# Patient Record
Sex: FEMALE | Race: BLACK OR AFRICAN AMERICAN | Employment: FULL TIME | URBAN - METROPOLITAN AREA
[De-identification: names, ages, dates, MRNs, and addresses within clinical notes are randomized per-mention and may not be internally consistent; named-entity substitution may affect disease eponyms.]

---

## 2022-11-23 ENCOUNTER — APPOINTMENT (OUTPATIENT)
Dept: CT IMAGING | Age: 62
DRG: 305 | End: 2022-11-23
Payer: COMMERCIAL

## 2022-11-23 ENCOUNTER — APPOINTMENT (OUTPATIENT)
Dept: GENERAL RADIOLOGY | Age: 62
DRG: 305 | End: 2022-11-23
Payer: COMMERCIAL

## 2022-11-23 ENCOUNTER — HOSPITAL ENCOUNTER (INPATIENT)
Age: 62
LOS: 1 days | Discharge: HOME | DRG: 305 | End: 2022-11-24
Attending: EMERGENCY MEDICINE | Admitting: STUDENT IN AN ORGANIZED HEALTH CARE EDUCATION/TRAINING PROGRAM
Payer: COMMERCIAL

## 2022-11-23 DIAGNOSIS — R07.9 CHEST PAIN, UNSPECIFIED TYPE: ICD-10-CM

## 2022-11-23 DIAGNOSIS — I16.9 HYPERTENSIVE CRISIS: Primary | ICD-10-CM

## 2022-11-23 PROBLEM — I16.0 HYPERTENSIVE URGENCY: Status: ACTIVE | Noted: 2022-11-23

## 2022-11-23 LAB
ABSOLUTE EOS #: 0.07 K/UL (ref 0–0.44)
ABSOLUTE IMMATURE GRANULOCYTE: 0.04 K/UL (ref 0–0.3)
ABSOLUTE LYMPH #: 2.53 K/UL (ref 1.1–3.7)
ABSOLUTE MONO #: 0.86 K/UL (ref 0.1–1.2)
ANION GAP SERPL CALCULATED.3IONS-SCNC: 12 MMOL/L (ref 9–17)
BASOPHILS # BLD: 0 % (ref 0–2)
BASOPHILS ABSOLUTE: <0.03 K/UL (ref 0–0.2)
BUN BLDV-MCNC: 22 MG/DL (ref 8–23)
CALCIUM SERPL-MCNC: 9.3 MG/DL (ref 8.6–10.4)
CHLORIDE BLD-SCNC: 103 MMOL/L (ref 98–107)
CO2: 23 MMOL/L (ref 20–31)
CREAT SERPL-MCNC: 0.9 MG/DL (ref 0.5–0.9)
EOSINOPHILS RELATIVE PERCENT: 1 % (ref 1–4)
GFR SERPL CREATININE-BSD FRML MDRD: >60 ML/MIN/1.73M2
GLUCOSE BLD-MCNC: 81 MG/DL (ref 70–99)
HCT VFR BLD CALC: 43.8 % (ref 36.3–47.1)
HEMOGLOBIN: 13.5 G/DL (ref 11.9–15.1)
IMMATURE GRANULOCYTES: 1 %
LYMPHOCYTES # BLD: 35 % (ref 24–43)
MAGNESIUM: 2.3 MG/DL (ref 1.6–2.6)
MCH RBC QN AUTO: 30.8 PG (ref 25.2–33.5)
MCHC RBC AUTO-ENTMCNC: 30.8 G/DL (ref 28.4–34.8)
MCV RBC AUTO: 99.8 FL (ref 82.6–102.9)
MONOCYTES # BLD: 12 % (ref 3–12)
NRBC AUTOMATED: 0 PER 100 WBC
PDW BLD-RTO: 12.3 % (ref 11.8–14.4)
PLATELET # BLD: 228 K/UL (ref 138–453)
PMV BLD AUTO: 10.9 FL (ref 8.1–13.5)
POTASSIUM SERPL-SCNC: 4.4 MMOL/L (ref 3.7–5.3)
PRO-BNP: 30 PG/ML
RBC # BLD: 4.39 M/UL (ref 3.95–5.11)
SEG NEUTROPHILS: 51 % (ref 36–65)
SEGMENTED NEUTROPHILS ABSOLUTE COUNT: 3.75 K/UL (ref 1.5–8.1)
SODIUM BLD-SCNC: 138 MMOL/L (ref 135–144)
TROPONIN, HIGH SENSITIVITY: 7 NG/L (ref 0–14)
TROPONIN, HIGH SENSITIVITY: 8 NG/L (ref 0–14)
TSH SERPL DL<=0.05 MIU/L-ACNC: 1.34 UIU/ML (ref 0.3–5)
WBC # BLD: 7.3 K/UL (ref 3.5–11.3)

## 2022-11-23 PROCEDURE — 96376 TX/PRO/DX INJ SAME DRUG ADON: CPT

## 2022-11-23 PROCEDURE — 74174 CTA ABD&PLVS W/CONTRAST: CPT

## 2022-11-23 PROCEDURE — 83880 ASSAY OF NATRIURETIC PEPTIDE: CPT

## 2022-11-23 PROCEDURE — 2060000000 HC ICU INTERMEDIATE R&B

## 2022-11-23 PROCEDURE — 96375 TX/PRO/DX INJ NEW DRUG ADDON: CPT

## 2022-11-23 PROCEDURE — 6370000000 HC RX 637 (ALT 250 FOR IP): Performed by: STUDENT IN AN ORGANIZED HEALTH CARE EDUCATION/TRAINING PROGRAM

## 2022-11-23 PROCEDURE — 99222 1ST HOSP IP/OBS MODERATE 55: CPT | Performed by: STUDENT IN AN ORGANIZED HEALTH CARE EDUCATION/TRAINING PROGRAM

## 2022-11-23 PROCEDURE — 6360000004 HC RX CONTRAST MEDICATION: Performed by: EMERGENCY MEDICINE

## 2022-11-23 PROCEDURE — 84484 ASSAY OF TROPONIN QUANT: CPT

## 2022-11-23 PROCEDURE — 94761 N-INVAS EAR/PLS OXIMETRY MLT: CPT

## 2022-11-23 PROCEDURE — 99285 EMERGENCY DEPT VISIT HI MDM: CPT

## 2022-11-23 PROCEDURE — 84443 ASSAY THYROID STIM HORMONE: CPT

## 2022-11-23 PROCEDURE — 2500000003 HC RX 250 WO HCPCS: Performed by: EMERGENCY MEDICINE

## 2022-11-23 PROCEDURE — 82088 ASSAY OF ALDOSTERONE: CPT

## 2022-11-23 PROCEDURE — 85025 COMPLETE CBC W/AUTO DIFF WBC: CPT

## 2022-11-23 PROCEDURE — 6360000002 HC RX W HCPCS: Performed by: EMERGENCY MEDICINE

## 2022-11-23 PROCEDURE — 83835 ASSAY OF METANEPHRINES: CPT

## 2022-11-23 PROCEDURE — 96374 THER/PROPH/DIAG INJ IV PUSH: CPT

## 2022-11-23 PROCEDURE — 93005 ELECTROCARDIOGRAM TRACING: CPT | Performed by: STUDENT IN AN ORGANIZED HEALTH CARE EDUCATION/TRAINING PROGRAM

## 2022-11-23 PROCEDURE — 71045 X-RAY EXAM CHEST 1 VIEW: CPT

## 2022-11-23 PROCEDURE — 71275 CT ANGIOGRAPHY CHEST: CPT

## 2022-11-23 PROCEDURE — 6360000002 HC RX W HCPCS

## 2022-11-23 PROCEDURE — 6370000000 HC RX 637 (ALT 250 FOR IP)

## 2022-11-23 PROCEDURE — 83735 ASSAY OF MAGNESIUM: CPT

## 2022-11-23 PROCEDURE — 80048 BASIC METABOLIC PNL TOTAL CA: CPT

## 2022-11-23 PROCEDURE — 2580000003 HC RX 258

## 2022-11-23 RX ORDER — ACETAMINOPHEN 325 MG/1
650 TABLET ORAL EVERY 6 HOURS PRN
Status: DISCONTINUED | OUTPATIENT
Start: 2022-11-23 | End: 2022-11-24 | Stop reason: HOSPADM

## 2022-11-23 RX ORDER — ONDANSETRON 4 MG/1
4 TABLET, ORALLY DISINTEGRATING ORAL EVERY 8 HOURS PRN
Status: DISCONTINUED | OUTPATIENT
Start: 2022-11-23 | End: 2022-11-24 | Stop reason: HOSPADM

## 2022-11-23 RX ORDER — ACETAMINOPHEN 650 MG/1
650 SUPPOSITORY RECTAL EVERY 6 HOURS PRN
Status: DISCONTINUED | OUTPATIENT
Start: 2022-11-23 | End: 2022-11-24 | Stop reason: HOSPADM

## 2022-11-23 RX ORDER — ENOXAPARIN SODIUM 100 MG/ML
40 INJECTION SUBCUTANEOUS DAILY
Status: DISCONTINUED | OUTPATIENT
Start: 2022-11-23 | End: 2022-11-24 | Stop reason: HOSPADM

## 2022-11-23 RX ORDER — SODIUM CHLORIDE 0.9 % (FLUSH) 0.9 %
5-40 SYRINGE (ML) INJECTION EVERY 12 HOURS SCHEDULED
Status: DISCONTINUED | OUTPATIENT
Start: 2022-11-23 | End: 2022-11-24 | Stop reason: HOSPADM

## 2022-11-23 RX ORDER — ACETAMINOPHEN 325 MG/1
650 TABLET ORAL EVERY 4 HOURS PRN
Status: DISCONTINUED | OUTPATIENT
Start: 2022-11-23 | End: 2022-11-24 | Stop reason: HOSPADM

## 2022-11-23 RX ORDER — ENOXAPARIN SODIUM 100 MG/ML
40 INJECTION SUBCUTANEOUS DAILY
Status: CANCELLED | OUTPATIENT
Start: 2022-11-23

## 2022-11-23 RX ORDER — CARVEDILOL 12.5 MG/1
12.5 TABLET ORAL 2 TIMES DAILY WITH MEALS
Status: DISCONTINUED | OUTPATIENT
Start: 2022-11-23 | End: 2022-11-24 | Stop reason: HOSPADM

## 2022-11-23 RX ORDER — AMLODIPINE BESYLATE 5 MG/1
5 TABLET ORAL DAILY
Status: DISCONTINUED | OUTPATIENT
Start: 2022-11-23 | End: 2022-11-24 | Stop reason: HOSPADM

## 2022-11-23 RX ORDER — ONDANSETRON 4 MG/1
4 TABLET, ORALLY DISINTEGRATING ORAL EVERY 8 HOURS PRN
Status: CANCELLED | OUTPATIENT
Start: 2022-11-23

## 2022-11-23 RX ORDER — M-VIT,TX,IRON,MINS/CALC/FOLIC 27MG-0.4MG
1 TABLET ORAL DAILY
COMMUNITY

## 2022-11-23 RX ORDER — SODIUM CHLORIDE 9 MG/ML
INJECTION, SOLUTION INTRAVENOUS PRN
Status: DISCONTINUED | OUTPATIENT
Start: 2022-11-23 | End: 2022-11-24 | Stop reason: HOSPADM

## 2022-11-23 RX ORDER — IBUPROFEN 200 MG
200 TABLET ORAL EVERY 8 HOURS PRN
Status: DISCONTINUED | OUTPATIENT
Start: 2022-11-23 | End: 2022-11-24 | Stop reason: HOSPADM

## 2022-11-23 RX ORDER — MULTIVIT WITH MINERALS/LUTEIN
250 TABLET ORAL DAILY
COMMUNITY

## 2022-11-23 RX ORDER — ONDANSETRON 2 MG/ML
4 INJECTION INTRAMUSCULAR; INTRAVENOUS ONCE
Status: DISCONTINUED | OUTPATIENT
Start: 2022-11-23 | End: 2022-11-23

## 2022-11-23 RX ORDER — ONDANSETRON 2 MG/ML
4 INJECTION INTRAMUSCULAR; INTRAVENOUS EVERY 6 HOURS PRN
Status: DISCONTINUED | OUTPATIENT
Start: 2022-11-23 | End: 2022-11-24 | Stop reason: HOSPADM

## 2022-11-23 RX ORDER — NICARDIPINE HYDROCHLORIDE 0.1 MG/ML
2.5-15 INJECTION INTRAVENOUS CONTINUOUS
Status: DISCONTINUED | OUTPATIENT
Start: 2022-11-23 | End: 2022-11-23

## 2022-11-23 RX ORDER — FENTANYL CITRATE 50 UG/ML
50 INJECTION, SOLUTION INTRAMUSCULAR; INTRAVENOUS ONCE
Status: COMPLETED | OUTPATIENT
Start: 2022-11-23 | End: 2022-11-23

## 2022-11-23 RX ORDER — SODIUM CHLORIDE 0.9 % (FLUSH) 0.9 %
5-40 SYRINGE (ML) INJECTION PRN
Status: DISCONTINUED | OUTPATIENT
Start: 2022-11-23 | End: 2022-11-24 | Stop reason: HOSPADM

## 2022-11-23 RX ORDER — POLYETHYLENE GLYCOL 3350 17 G/17G
17 POWDER, FOR SOLUTION ORAL DAILY PRN
Status: DISCONTINUED | OUTPATIENT
Start: 2022-11-23 | End: 2022-11-24 | Stop reason: HOSPADM

## 2022-11-23 RX ADMIN — CARVEDILOL 12.5 MG: 12.5 TABLET, FILM COATED ORAL at 21:19

## 2022-11-23 RX ADMIN — ACETAMINOPHEN 650 MG: 325 TABLET ORAL at 17:38

## 2022-11-23 RX ADMIN — ONDANSETRON 4 MG: 2 INJECTION INTRAMUSCULAR; INTRAVENOUS at 14:55

## 2022-11-23 RX ADMIN — NICARDIPINE HYDROCHLORIDE 7.5 MG/HR: 0.1 INJECTION INTRAVENOUS at 13:55

## 2022-11-23 RX ADMIN — FENTANYL CITRATE 50 MCG: 50 INJECTION, SOLUTION INTRAMUSCULAR; INTRAVENOUS at 11:36

## 2022-11-23 RX ADMIN — IOPAMIDOL 100 ML: 755 INJECTION, SOLUTION INTRAVENOUS at 11:47

## 2022-11-23 RX ADMIN — NICARDIPINE HYDROCHLORIDE 7.5 MG/HR: 0.1 INJECTION INTRAVENOUS at 11:28

## 2022-11-23 RX ADMIN — NICARDIPINE HYDROCHLORIDE 5 MG/HR: 0.1 INJECTION INTRAVENOUS at 11:06

## 2022-11-23 RX ADMIN — SODIUM CHLORIDE, PRESERVATIVE FREE 10 ML: 5 INJECTION INTRAVENOUS at 21:03

## 2022-11-23 ASSESSMENT — ENCOUNTER SYMPTOMS
NAUSEA: 0
RHINORRHEA: 0
VOMITING: 0
SHORTNESS OF BREATH: 0
COUGH: 0
DIARRHEA: 0
BACK PAIN: 1
SORE THROAT: 0
ABDOMINAL PAIN: 0

## 2022-11-23 ASSESSMENT — PAIN SCALES - GENERAL
PAINLEVEL_OUTOF10: 8
PAINLEVEL_OUTOF10: 8

## 2022-11-23 ASSESSMENT — PAIN - FUNCTIONAL ASSESSMENT: PAIN_FUNCTIONAL_ASSESSMENT: 0-10

## 2022-11-23 ASSESSMENT — PAIN DESCRIPTION - LOCATION: LOCATION: HEAD

## 2022-11-23 NOTE — ED PROVIDER NOTES
[facility]  ED  EMERGENCY DEPARTMENT ENCOUNTER    Pt Name: Briana Bello  MRN: [id number]  [id number]  Date of evaluation: 11/23/2022      CHIEF COMPLAINT       Chief Complaint   Patient presents with    Chest Pain    Back Pain         HISTORY OF PRESENT ILLNESS    Briana Bello is a 57 y.o. female who presents chest pain. Patient states it was at work and began suddenly radiates into her back sharp. Some paresthesias down her right arm although that has been an ongoing issue for her. States she formally was on blood pressure medicine but her doctor stopped them 2 years ago and has not been on them since. No recent illnesses no recent injuries. States she is never had pain like this before. Does not ripping or migrating up and on her back at this time. Just does not feel well. Blood pressure for EMS was systolic of 655 332W here on arrival did receive 2 sublingual nitro with some improvement of her pain. Also received aspirin from EMS         REVIEW OF SYSTEMS       Review of Systems   Constitutional:  Negative for chills and fever. HENT:  Negative for rhinorrhea and sore throat. Eyes:  Negative for visual disturbance. Respiratory:  Negative for cough and shortness of breath. Cardiovascular:  Positive for chest pain. Gastrointestinal:  Negative for abdominal pain, diarrhea, nausea and vomiting. Genitourinary:  Negative for difficulty urinating. Musculoskeletal:  Positive for back pain. Negative for neck pain. Skin:  Negative for rash. Neurological:  Negative for headaches. PAST MEDICAL HISTORY    has no past medical history on file. SURGICAL HISTORY      has a past surgical history that includes Lithotripsy and Appendectomy. CURRENT MEDICATIONS       Previous Medications    IBUPROFEN (ADVIL;MOTRIN) 800 MG TABLET    TAKE ONE TABLET BY MOUTH EVERY 8 HOURS AS NEEDED FOR PAIN       ALLERGIES     has No Known Allergies.     FAMILY HISTORY She indicated that her mother is alive. She indicated that her father is . She indicated that both of her sisters are alive. She indicated that her brother is alive. family history includes Diabetes in her mother; Heart Failure in her father; High Blood Pressure in her mother; Kidney Disease in her mother. SOCIAL HISTORY      reports that she has never smoked. She does not have any smokeless tobacco history on file. She reports that she does not drink alcohol and does not use drugs. PHYSICAL EXAM     INITIAL VITALS:  height is 5' 8\" (1.727 m) and weight is 168 lb (76.2 kg). Her oral temperature is 98 °F (36.7 °C). Her blood pressure is 150/90 (abnormal) and her pulse is 73. Her respiration is 16 and oxygen saturation is 92%. Physical Exam  Constitutional:       General: She is not in acute distress. Comments: Patient is appears uncomfortable but not ill or toxic   HENT:      Head: Normocephalic and atraumatic. Mouth/Throat:      Mouth: Mucous membranes are moist.      Pharynx: Oropharynx is clear. Eyes:      General: No scleral icterus. Pupils: Pupils are equal, round, and reactive to light. Cardiovascular:      Rate and Rhythm: Normal rate and regular rhythm. Heart sounds: Normal heart sounds. No murmur heard. No friction rub. No gallop. Comments: Equal radial and pedal pulses bilaterally  Pulmonary:      Effort: Pulmonary effort is normal. No respiratory distress. Breath sounds: Normal breath sounds. Abdominal:      General: There is no distension. Palpations: Abdomen is soft. Tenderness: There is no abdominal tenderness. There is no guarding or rebound. Comments: No pulsatile abdominal mass   Musculoskeletal:         General: Normal range of motion. Cervical back: Normal range of motion and neck supple. Skin:     General: Skin is warm and dry.    Neurological:      Mental Status: She is alert and oriented to person, place, and time.      Gait: Gait normal.   Psychiatric:         Mood and Affect: Mood normal.         Behavior: Behavior normal.       DIFFERENTIAL DIAGNOSIS/ MDM:     Given sudden onset nature chest pain rating the back primary concern is aortic dissection. Will start Cardene for blood pressure control heart rate is already in the 60s. Will order chest x-ray EKG labs plan for dissection study monitor closely    DIAGNOSTIC RESULTS     EKG: All EKG's are interpreted by the Emergency Department Physician who either signs or Co-signs this chart in the [address] a cardiologist.    sinus rhythm 67 normal intervals normal axis no acute ST or T changes. There is LVH. No acute findings. No old for comparison    RADIOLOGY:   I directly visualized the following  images and reviewed theradiologist interpretations:     CTA CHEST W WO CONTRAST   Final Result   1. No evidence for thoracic or abdominal aorta aneurysm or dissection. 2. Calcific and mostly noncalcific plaque in the tortuous proximal left main   renal artery. Tortuosity limits evaluation of degree of stenosis but appears   to be less than 50%. 3. No acute infective or inflammatory process. CTA ABDOMEN PELVIS W CONTRAST   Final Result   1. No evidence for thoracic or abdominal aorta aneurysm or dissection. 2. Calcific and mostly noncalcific plaque in the tortuous proximal left main   renal artery. Tortuosity limits evaluation of degree of stenosis but appears   to be less than 50%. 3. No acute infective or inflammatory process. XR CHEST PORTABLE   Final Result   1. Cardiomegaly. 2. No acute focal airspace consolidation.                ED BEDSIDE ULTRASOUND:   none    LABS:  Labs Reviewed   CBC WITH AUTO DIFFERENTIAL - Abnormal; Notable for the following components:       Result Value    Immature Granulocytes 1 (*)     All other components within normal limits   BASIC METABOLIC PANEL   BRAIN NATRIURETIC PEPTIDE   MAGNESIUM   TROPONIN   TROPONIN EMERGENCY DEPARTMENT COURSE:   Vitals:    Vitals:    22 1210 22 1220 22 1230 22 1240   BP: (!) 148/93 130/85 (!) 137/90 (!) 150/90   Pulse: 84 77 78 73   Resp: 12  16   Temp:       TempSrc:       SpO2: 95% 93% 92% 92%   Weight:       Height:         -------------------------  BP: (!) 150/90, Temp: 98 °F (36.7 °C), Heart Rate: 73, Resp: 16    11:34 AM EST  Recheck this time has been on the Cardene drip systolic already down in the 160s 7. No chest pain but still has back pain still appears uncomfortable will dose with fentanyl, continue to await test continue to titrate    1:05 PM EST  Thankfully no dissection seen on CT especially she now recalls that \"this is what my mother  from. \"  Patient is resting comfortably blood pressure is 150/90. Chest pain has resolved back pain significantly improved again still not migrating. Given elevated blood pressure with chest pain concern for endorgan damage will admit. Call out to medicine    CRITICAL CARE:     CRITICAL CARE: There was a high probability of clinically significant/life threatening deterioration in this patient's condition which required my urgent intervention. Total critical care time was 30 minutes. This excludes any time for separately reportable procedures. CONSULTS:  IP CONSULT TO INTERNAL MEDICINE    PROCEDURES:  None    FINAL IMPRESSION      1. Hypertensive crisis    2. Chest pain, unspecified type          DISPOSITION/PLAN       PATIENT REFERRED TO:  No follow-up provider specified. DISCHARGE MEDICATIONS:  New Prescriptions    No medications on file       (Please note that portions of this note were completed with a voice recognition program.  Efforts were made to edit the dictations butoccasionally words are mis-transcribed. )    Francisco Javier Barrios MD  Attending Emergency Physician                   Francisco Javier Barrios MD  [phone]

## 2022-11-23 NOTE — CARE COORDINATION
Case Management Assessment  Initial Evaluation    Date/Time of Evaluation: 11/23/2022 1:46 PM  Assessment Completed by: Neeta Loaiza RN    If patient is discharged prior to next notation, then this note serves as note for discharge by case management. Patient Name: Deena Bryant                   YOB: 1960  Diagnosis: No admission diagnoses are documented for this encounter. Date / Time: 11/23/2022 10:44 AM    Patient Admission Status: Emergency   Readmission Risk (Low < 19, Mod (19-27), High > 27): No data recorded  Current PCP: No primary care provider on file. PCP verified by CM? Yes Ronald Vyas)    Chart Reviewed: Yes      History Provided by: Patient  Patient Orientation: Alert and Oriented    Patient Cognition: Alert    Hospitalization in the last 30 days (Readmission):  No    If yes, Readmission Assessment in CM Navigator will be completed. Advance Directives:      Code Status: Not on file   Patient's Primary Decision Maker is:        Discharge Planning:    Patient lives with: Spouse/Significant Other Type of Home: House  Primary Care Giver: Self  Patient Support Systems include: Spouse/Significant Other   Current Financial resources:    Current community resources:    Current services prior to admission: None            Current DME:              Type of Home Care services:  None    ADLS  Prior functional level: Independent in ADLs/IADLs  Current functional level: Independent in ADLs/IADLs    PT AM-PAC:   /24  OT AM-PAC:   /24    Family can provide assistance at DC: No  Would you like Case Management to discuss the discharge plan with any other family members/significant others, and if so, who?     Plans to Return to Present Housing: Yes  Other Identified Issues/Barriers to RETURNING to current housing: none  Potential Assistance needed at discharge: N/A            Potential DME:    Patient expects to discharge to: [address] [place] [address] for transportation at discharge:      Financial    Payor: /     Does insurance require precert for SNF: Yes    Potential assistance Purchasing Medications: No  Meds-to-Beds request:        [facility] ([facility]  [address] [place] [address]  [address] [facility] ([facility]) [address]  Phone: [phone] Fax: [phone]      Notes:    Factors facilitating achievement of predicted outcomes: Family support    Barriers to discharge: Additional Case Management Notes: home with     The Plan for Transition of Care is related to the following treatment goals of No admission diagnoses are documented for this encounter. IF APPLICABLE: The Patient and/or patient representative Yane and her family were provided with a choice of provider and agrees with the discharge plan. Freedom of choice list with basic dialogue that supports the patient's individualized plan of care/goals and shares the quality data associated with the providers was provided to:     Patient Representative Name:       The Patient and/or Patient Representative Agree with the Discharge Plan?       Sandra Palacio RN  Case Management Department  Ph: [phone] Fax:

## 2022-11-23 NOTE — ED TRIAGE NOTES
Patient was brought by [facility] for chest pain and back pain with pain going down her right arm. Patient states she was at work today when the pain started. Patient states she was taken off her blood pressure meds two years ago. Patient is hypertensive on arrival  Pt received ASA and 2 nitros PTA. Pt respirations are even and unlabored, pt is oriented X 4, speaking in complete sentences, bed is in the lowest position, call light is within reach. Will continue to monitor.

## 2022-11-23 NOTE — ED NOTES
Jenny, tech transporting patient to room 0405 by bed.  No infusions running     Casper Irizarry RN  11/23/22 [id number]

## 2022-11-24 VITALS
HEART RATE: 82 BPM | WEIGHT: 168.65 LBS | OXYGEN SATURATION: 98 % | BODY MASS INDEX: 24.98 KG/M2 | HEIGHT: 69 IN | SYSTOLIC BLOOD PRESSURE: 143 MMHG | RESPIRATION RATE: 11 BRPM | TEMPERATURE: 98.8 F | DIASTOLIC BLOOD PRESSURE: 95 MMHG

## 2022-11-24 PROBLEM — R07.9 CHEST PAIN: Status: RESOLVED | Noted: 2022-11-23 | Resolved: 2022-11-24

## 2022-11-24 PROBLEM — I16.1 HYPERTENSIVE EMERGENCY: Status: ACTIVE | Noted: 2022-11-24

## 2022-11-24 PROBLEM — I70.1 LEFT RENAL ARTERY STENOSIS (HCC): Status: ACTIVE | Noted: 2022-11-24

## 2022-11-24 PROBLEM — I16.1 HYPERTENSIVE EMERGENCY: Status: RESOLVED | Noted: 2022-11-24 | Resolved: 2022-11-24

## 2022-11-24 LAB
AMPHETAMINE SCREEN URINE: NEGATIVE
BARBITURATE SCREEN URINE: NEGATIVE
BENZODIAZEPINE SCREEN, URINE: NEGATIVE
BILIRUBIN URINE: NEGATIVE
CANNABINOID SCREEN URINE: NEGATIVE
CASTS UA: NORMAL /LPF (ref 0–8)
COCAINE METABOLITE, URINE: NEGATIVE
COLOR: YELLOW
EPITHELIAL CELLS UA: NORMAL /HPF (ref 0–5)
FENTANYL URINE: POSITIVE
GLUCOSE URINE: NEGATIVE
KETONES, URINE: NEGATIVE
LEUKOCYTE ESTERASE, URINE: ABNORMAL
METHADONE SCREEN, URINE: NEGATIVE
NITRITE, URINE: NEGATIVE
OPIATES, URINE: NEGATIVE
OXYCODONE SCREEN URINE: NEGATIVE
PH UA: 6.5 (ref 5–8)
PHENCYCLIDINE, URINE: NEGATIVE
PROTEIN UA: NEGATIVE
RBC UA: NORMAL /HPF (ref 0–4)
SPECIFIC GRAVITY UA: 1.02 (ref 1–1.03)
TEST INFORMATION: ABNORMAL
TURBIDITY: CLEAR
URINE HGB: ABNORMAL
UROBILINOGEN, URINE: NORMAL
WBC UA: NORMAL /HPF (ref 0–5)

## 2022-11-24 PROCEDURE — 99232 SBSQ HOSP IP/OBS MODERATE 35: CPT | Performed by: STUDENT IN AN ORGANIZED HEALTH CARE EDUCATION/TRAINING PROGRAM

## 2022-11-24 PROCEDURE — 84244 ASSAY OF RENIN: CPT

## 2022-11-24 PROCEDURE — 36415 COLL VENOUS BLD VENIPUNCTURE: CPT

## 2022-11-24 PROCEDURE — 6370000000 HC RX 637 (ALT 250 FOR IP): Performed by: STUDENT IN AN ORGANIZED HEALTH CARE EDUCATION/TRAINING PROGRAM

## 2022-11-24 PROCEDURE — 6370000000 HC RX 637 (ALT 250 FOR IP)

## 2022-11-24 PROCEDURE — 81001 URINALYSIS AUTO W/SCOPE: CPT

## 2022-11-24 PROCEDURE — 2580000003 HC RX 258

## 2022-11-24 PROCEDURE — 80307 DRUG TEST PRSMV CHEM ANLYZR: CPT

## 2022-11-24 RX ORDER — CARVEDILOL 12.5 MG/1
12.5 TABLET ORAL 2 TIMES DAILY WITH MEALS
Qty: 60 TABLET | Refills: 3 | Status: SHIPPED | OUTPATIENT
Start: 2022-11-24

## 2022-11-24 RX ORDER — AMLODIPINE BESYLATE 5 MG/1
5 TABLET ORAL DAILY
Qty: 30 TABLET | Refills: 3 | Status: SHIPPED | OUTPATIENT
Start: 2022-11-25

## 2022-11-24 RX ADMIN — CARVEDILOL 12.5 MG: 12.5 TABLET, FILM COATED ORAL at 08:19

## 2022-11-24 RX ADMIN — AMLODIPINE BESYLATE 5 MG: 5 TABLET ORAL at 08:19

## 2022-11-24 RX ADMIN — CARVEDILOL 12.5 MG: 12.5 TABLET, FILM COATED ORAL at 16:01

## 2022-11-24 RX ADMIN — SODIUM CHLORIDE, PRESERVATIVE FREE 10 ML: 5 INJECTION INTRAVENOUS at 08:19

## 2022-11-24 RX ADMIN — ACETAMINOPHEN 650 MG: 325 TABLET ORAL at 11:22

## 2022-11-24 ASSESSMENT — PAIN DESCRIPTION - LOCATION: LOCATION: BACK

## 2022-11-24 ASSESSMENT — PAIN SCALES - GENERAL
PAINLEVEL_OUTOF10: 1
PAINLEVEL_OUTOF10: 3

## 2022-11-24 ASSESSMENT — PAIN - FUNCTIONAL ASSESSMENT: PAIN_FUNCTIONAL_ASSESSMENT: ACTIVITIES ARE NOT PREVENTED

## 2022-11-24 ASSESSMENT — PAIN DESCRIPTION - DESCRIPTORS: DESCRIPTORS: ACHING

## 2022-11-24 ASSESSMENT — PAIN DESCRIPTION - ORIENTATION: ORIENTATION: RIGHT;UPPER

## 2022-11-24 NOTE — PROGRESS NOTES
[facility]  Internal Medicine Teaching Residency Program  Inpatient Daily Progress Note  ______________________________________________________________________________    Patient: Nathen Mosqueda  YOB: 1960   HMV:[id number]    Acct: [de-identified]     Room: [address] [place] [address]-  Admit date: 11/23/2022  Today's date: 11/24/22  Number of days in the hospital: 1    SUBJECTIVE   Admitting Diagnosis: Hypertensive emergency  CC: Chest pain, elevated blood pressure  Pt examined at bedside. Chart & results reviewed. -Seen and examined at bedside, patient is off the drip, BP well controlled 119/86, heart rate 73, currently not oral BP meds, patient is ready to be discharged with outpatient follow-up for renal artery stenosis, urinary metanephrine level. ROS:  Constitutional:  negative for chills, fevers, sweats  Respiratory:  negative for cough, dyspnea on exertion, hemoptysis, shortness of breath, wheezing  Cardiovascular:  negative for chest pain, chest pressure/discomfort, lower extremity edema, palpitations  Gastrointestinal:  negative for abdominal pain, constipation, diarrhea, nausea, vomiting  Neurological:  negative for dizziness, headache  BRIEF HISTORY     The patient is a pleasant 57 y.o. female presents with the chief complaint of pain under the left breast with radiation to the back, very high blood pressure in the range of 210/190. Patient was found by the EMS and the EMS found the BP to be 210/190. In the ED the blood pressure was 185/110. Patient denies any fever, shortness of breath. Denies any focal neurological deficit, blurry vision, oliguria/anuria, severe abdominal pain. Patient was on lisinopril for couple of days but since last 2 years someone has taken lisinopril out and currently she is under no medication for blood pressure. Patient never had an evaluation for secondary hypertension.   On my evaluation today, patient was alert and oriented x4. Blood pressure was coming down as the new Cardene drip was going on.  /87 during evaluation, patient was slightly tachycardic. Wicho Patella OBJECTIVE     Vital Signs:  /74   Pulse 78   Temp 98.2 °F (36.8 °C) (Oral)   Resp 22   Ht 5' 8.5\" (1.74 m)   Wt 168 lb 10.4 oz (76.5 kg)   SpO2 95%   BMI 25.27 kg/m²     Temp (24hrs), Av.2 °F (36.8 °C), Min:97.9 °F (36.6 °C), Max:98.6 °F (37 °C)    In: -   Out: 1000 [Urine:1000]    Physical Exam:  Constitutional: This is a well developed, well nourished, 25-29.9 - Overweight 57y.o. year old female who is alert, oriented, cooperative and in no apparent distress. Head:normocephalic and atraumatic. EENT:  PERRLA. No conjunctival injections. Septum was midline, mucosa was without erythema, exudates or cobblestoning. No thrush was noted. Neck: Supple without thyromegaly. No elevated JVP. Trachea was midline. Respiratory: Chest was symmetrical without dullness to percussion. Breath sounds bilaterally were clear to auscultation. There were no wheezes, rhonchi or rales. There is no intercostal retraction or use of accessory muscles. No egophony noted. Cardiovascular: Regular without murmur, clicks, gallops or rubs. Abdomen: Slightly rounded and soft without organomegaly. No rebound, rigidity or guarding was appreciated. Lymphatic: No lymphadenopathy. Musculoskeletal: Normal curvature of the spine. No gross muscle weakness. Extremities:  No lower extremity edema, ulcerations, tenderness, varicosities or erythema. Muscle size, tone and strength are normal.  No involuntary movements are noted. Skin:  Warm and dry. Good color, turgor and pigmentation. No lesions or scars.   No cyanosis or clubbing  Neurological/Psychiatric: The patient's general behavior, level of consciousness, thought content and emotional status is normal.        Medications:  Scheduled Medications:    sodium chloride flush  5-40 mL IntraVENous 2 times per day enoxaparin  40 mg SubCUTAneous Daily    sodium chloride flush  5-40 mL IntraVENous 2 times per day    carvedilol  12.5 mg Oral BID WC    amLODIPine  5 mg Oral Daily     Continuous Infusions:    sodium chloride      sodium chloride       PRN Medicationssodium chloride flush, 5-40 mL, PRN  sodium chloride, , PRN  acetaminophen, 650 mg, Q4H PRN  ondansetron, 4 mg, Q8H PRN   Or  ondansetron, 4 mg, Q6H PRN  sodium chloride flush, 5-40 mL, PRN  sodium chloride, , PRN  ondansetron, 4 mg, Q6H PRN  polyethylene glycol, 17 g, Daily PRN  acetaminophen, 650 mg, Q6H PRN   Or  acetaminophen, 650 mg, Q6H PRN  ibuprofen, 200 mg, Q8H PRN        Diagnostic Labs:  CBC:   Recent Labs     11/23/22  1102   WBC 7.3   RBC 4.39   HGB 13.5   HCT 43.8   MCV 99.8   RDW 12.3        BMP:   Recent Labs     11/23/22  1102      K 4.4      CO2 23   BUN 22   CREATININE 0.90     BNP: No results for input(s): BNP in the last 72 hours. PT/INR: No results for input(s): PROTIME, INR in the last 72 hours. APTT: No results for input(s): APTT in the last 72 hours. CARDIAC ENZYMES: No results for input(s): CKMB, CKMBINDEX, TROPONINI in the last 72 hours. Invalid input(s): CKTOTAL;3  FASTING LIPID PANEL:  Lab Results   Component Value Date    CHOL 162 10/03/2014    HDL 50 10/03/2014    TRIG 72 10/03/2014     LIVER PROFILE: No results for input(s): AST, ALT, ALB, BILIDIR, BILITOT, ALKPHOS in the last 72 hours. MICROBIOLOGY: No results found for: CULTURE    Imaging:    CTA CHEST W WO CONTRAST    Result Date: 11/23/2022  1. No evidence for thoracic or abdominal aorta aneurysm or dissection. 2. Calcific and mostly noncalcific plaque in the tortuous proximal left main renal artery. Tortuosity limits evaluation of degree of stenosis but appears to be less than 50%. 3. No acute infective or inflammatory process. XR CHEST PORTABLE    Result Date: 11/23/2022  1. Cardiomegaly. 2. No acute focal airspace consolidation.      CTA ABDOMEN PELVIS W CONTRAST    Result Date: 11/23/2022  1. No evidence for thoracic or abdominal aorta aneurysm or dissection. 2. Calcific and mostly noncalcific plaque in the tortuous proximal left main renal artery. Tortuosity limits evaluation of degree of stenosis but appears to be less than 50%. 3. No acute infective or inflammatory process. ASSESSMENT & PLAN     ASSESSMENT / PLAN:     Principal Problem:    Hypertensive emergency  Active Problems:    Chest pain    Migraine  Resolved Problems:    * No resolved hospital problems. *    1. Hypertensive emergency:  -RESOLVED,READY TO BE DISCHARGED WITH OUT PATIENT FOLLOW UP.  -Was on Cardene drip, currently discontinued  -EMS found initially the BP to be as high as 210/180  -Currently blood pressure 140/87  -Cardene drip discontinued  -Currently on Norvasc 5 mg once daily, Coreg 12.5 mg oral twice daily  -We will order serum renin, aldosterone level to calculate renin, aldosterone ratio  -Urine metanephrine pending  -Troponin normal, BMP unremarkable  -CTA chest showed calcific and mostly noncalcific plaque in the tortuous proximal left main renal artery, appears to be less than 50% of the renal artery stenosis            PT/OT: We will consult PT, OT  Discharge Planning / SW:  consulted    Servando Valdez MD  Internal Medicine Resident, PGY-1  [address], [place]  11/24/2022, 11:25 AM

## 2022-11-24 NOTE — DISCHARGE INSTRUCTIONS
-Please follow-up with your primary care provider in 1 week. If you do not have 1 please establish with the internal medicine clinic (number provided)  -Please follow-up with nephrology for renal artery stenosis  -Please take blood pressure medications as written  -Consider starting a statin medication. Discussed with your primary care provider.

## 2022-11-24 NOTE — H&P
[address] [facility]     Department of Internal Medicine - Staff Internal Medicine Teaching Service          ADMISSION NOTE/HISTORY AND PHYSICAL EXAMINATION   Date: 11/23/2022  Patient Name: Ward Lindsey  Date of admission: 11/23/2022 10:44 AM  YOB: 1960  PCP: No primary care provider on file. History Obtained From:  patient, electronic medical record    CHIEF COMPLAINT     Chief complaint: Chest pain, elevated blood pressure  HISTORY OF PRESENTING ILLNESS     The patient is a pleasant 57 y.o. female presents with the chief complaint of pain under the left breast with radiation to the back, very high blood pressure in the range of 210/190. Patient was found by the EMS and the EMS found the BP to be 210/190. In the ED the blood pressure was 185/110. Patient denies any fever, shortness of breath. Denies any focal neurological deficit, blurry vision, oliguria/anuria, severe abdominal pain. Patient was on lisinopril for couple of days but since last 2 years someone has taken lisinopril out and currently she is under no medication for blood pressure. Patient never had an evaluation for secondary hypertension. On my evaluation today, patient was alert and oriented x4. Blood pressure was coming down as the new Cardene drip was going on.  /87 during evaluation, patient was slightly tachycardic. Radha Yanes Review of Systems:  General ROS: Completed and except as mentioned above were negative   HEENT ROS: Completed and except as mentioned above were negative   Allergy and Immunology ROS:  Completed and except as mentioned above were negative  Hematological and Lymphatic ROS:  Completed and except as mentioned above were negative  Respiratory ROS:  Completed and except as mentioned above were negative  Cardiovascular ROS: Patient was complaining of chest pain.   Gastrointestinal ROS: Completed and except as mentioned above were negative  Genito-Urinary ROS:  Completed and except as mentioned above were negative  Musculoskeletal ROS:  Completed and except as mentioned above were negative  Neurological ROS:  Completed and except as mentioned above were negative  Skin & Dermatological ROS:  Completed and except as mentioned above were negative  Psychological ROS:  Completed and except as mentioned above were negative    PAST MEDICAL HISTORY     History reviewed. No pertinent past medical history. PAST SURGICAL HISTORY     Past Surgical History:   Procedure Laterality Date    APPENDECTOMY      LITHOTRIPSY         ALLERGIES     Patient has no known allergies. MEDICATIONS PRIOR TO ADMISSION     Prior to Admission medications    Medication Sig Start Date End Date Taking? Authorizing Provider   Multiple Vitamins-Minerals (THERAPEUTIC MULTIVITAMIN-MINERALS) tablet Take 1 tablet by mouth daily   Yes Historical Provider, MD   Ascorbic Acid (VITAMIN C) 250 MG tablet Take 250 mg by mouth daily   Yes Historical Provider, MD   ibuprofen (ADVIL;MOTRIN) 800 MG tablet TAKE ONE TABLET BY MOUTH EVERY 8 HOURS AS NEEDED FOR PAIN 3/9/16   CHIDI Bar - CNP       SOCIAL HISTORY     Tobacco: Never smoked  Alcohol: Nonalcoholic  Illicits: No history of drug abuSE    FAMILY HISTORY     Family History   Problem Relation Age of Onset    High Blood Pressure Mother     Diabetes Mother     Kidney Disease Mother     Heart Failure Father        PHYSICAL EXAM     Vitals: BP (!) 141/87   Pulse 81   Temp 98.1 °F (36.7 °C) (Oral)   Resp 20   Ht 5' 8.5\" (1.74 m)   Wt 168 lb 10.4 oz (76.5 kg)   SpO2 100%   BMI 25.27 kg/m²   Tmax: Temp (24hrs), Av.1 °F (36.7 °C), Min:98 °F (36.7 °C), Max:98.1 °F (36.7 °C)    Last Body weight:   Wt Readings from Last 3 Encounters:   22 168 lb 10.4 oz (76.5 kg)   06/19/15 164 lb 6.4 oz (74.6 kg)   14 164 lb 3.2 oz (74.5 kg)     Body Mass Index : Body mass index is 25.27 kg/m².       PHYSICAL EXAMINATION:  Physical Exam  Constitutional:       Appearance: Normal appearance. Cardiovascular:      Rate and Rhythm: Normal rate and regular rhythm. Pulses: Normal pulses. Heart sounds: Normal heart sounds. No murmur heard. No friction rub. Pulmonary:      Effort: Pulmonary effort is normal. No respiratory distress. Breath sounds: Normal breath sounds. No wheezing. Abdominal:      General: Abdomen is flat. There is no distension. Palpations: Abdomen is soft. There is no mass. Tenderness: There is no abdominal tenderness. Musculoskeletal:         General: No swelling, tenderness or deformity. Normal range of motion. Neurological:      General: No focal deficit present. Mental Status: She is alert and oriented to person, place, and time. Cranial Nerves: No cranial nerve deficit. Sensory: No sensory deficit.       INVESTIGATIONS     Laboratory Testing:     Recent Results (from the past 24 hour(s))   Basic Metabolic Panel    Collection Time: 11/23/22 11:02 AM   Result Value Ref Range    Glucose 81 70 - 99 mg/dL    BUN 22 8 - 23 mg/dL    Creatinine 0.90 0.50 - 0.90 mg/dL    Est, Glom Filt Rate >60 >60 mL/min/1.73m2    Calcium 9.3 8.6 - 10.4 mg/dL    Sodium 138 135 - 144 mmol/L    Potassium 4.4 3.7 - 5.3 mmol/L    Chloride 103 98 - 107 mmol/L    CO2 23 20 - 31 mmol/L    Anion Gap 12 9 - 17 mmol/L   Brain Natriuretic Peptide    Collection Time: 11/23/22 11:02 AM   Result Value Ref Range    Pro-BNP 30 <300 pg/mL   CBC with Auto Differential    Collection Time: 11/23/22 11:02 AM   Result Value Ref Range    WBC 7.3 3.5 - 11.3 k/uL    RBC 4.39 3.95 - 5.11 m/uL    Hemoglobin 13.5 11.9 - 15.1 g/dL    Hematocrit 43.8 36.3 - 47.1 %    MCV 99.8 82.6 - 102.9 fL    MCH 30.8 25.2 - 33.5 pg    MCHC 30.8 28.4 - 34.8 g/dL    RDW 12.3 11.8 - 14.4 %    Platelets 269 340 - 626 k/uL    MPV 10.9 8.1 - 13.5 fL    NRBC Automated 0.0 0.0 per 100 WBC    Seg Neutrophils 51 36 - 65 %    Lymphocytes 35 24 - 43 %    Monocytes 12 3 - 12 %    Eosinophils % 1 1 - 4 %    Basophils 0 0 - 2 %    Immature Granulocytes 1 (H) 0 %    Segs Absolute 3.75 1.50 - 8.10 k/uL    Absolute Lymph # 2.53 1.10 - 3.70 k/uL    Absolute Mono # 0.86 0.10 - 1.20 k/uL    Absolute Eos # 0.07 0.00 - 0.44 k/uL    Basophils Absolute <0.03 0.00 - 0.20 k/uL    Absolute Immature Granulocyte 0.04 0.00 - 0.30 k/uL   Magnesium    Collection Time: 11/23/22 11:02 AM   Result Value Ref Range    Magnesium 2.3 1.6 - 2.6 mg/dL   Troponin    Collection Time: 11/23/22 11:02 AM   Result Value Ref Range    Troponin, High Sensitivity 8 0 - 14 ng/L   Troponin    Collection Time: 11/23/22 12:21 PM   Result Value Ref Range    Troponin, High Sensitivity 7 0 - 14 ng/L   TSH with Reflex    Collection Time: 11/23/22  3:20 PM   Result Value Ref Range    TSH 1.34 0.30 - 5.00 uIU/mL       Imaging:   CTA CHEST W WO CONTRAST    Result Date: 11/23/2022  1. No evidence for thoracic or abdominal aorta aneurysm or dissection. 2. Calcific and mostly noncalcific plaque in the tortuous proximal left main renal artery. Tortuosity limits evaluation of degree of stenosis but appears to be less than 50%. 3. No acute infective or inflammatory process. XR CHEST PORTABLE    Result Date: 11/23/2022  1. Cardiomegaly. 2. No acute focal airspace consolidation. CTA ABDOMEN PELVIS W CONTRAST    Result Date: 11/23/2022  1. No evidence for thoracic or abdominal aorta aneurysm or dissection. 2. Calcific and mostly noncalcific plaque in the tortuous proximal left main renal artery. Tortuosity limits evaluation of degree of stenosis but appears to be less than 50%. 3. No acute infective or inflammatory process. ASSESSMENT & PLAN     ASSESSMENT / PLAN:     IMPRESSION  This is a 57 y.o. female who presented with pain and found to have hypertensive emergency. Principal Problem:    Hypertensive emergency  Active Problems:    Chest pain  Resolved Problems:    * No resolved hospital problems. *    1.  Hypertensive emergency:  -Was on Cardene drip, currently discontinued  -EMS found initially the BP to be as high as 210/180  -Currently blood pressure 140/87  -Cardene drip discontinued  -Currently on Norvasc 5 mg once daily, Coreg 12.5 mg oral twice daily  -We will order serum renin, aldosterone level to calculate renin, aldosterone ratio  -Urine metanephrine pending  -Troponin normal, BMP unremarkable  -CTA chest showed calcific and mostly noncalcific plaque in the tortuous proximal left main renal artery, appears to be less than 50% of the renal artery stenosis. DVT ppx: Lovenox 40 mg subcu      PT/OT/SW: We will consult PT, OT  Discharge Planning:  consulted    Raffaele Payne MD  Internal Medicine Resident, PGY-1  [facility];  [place], [place]  11/23/2022, 7:13 PM

## 2022-11-24 NOTE — PLAN OF CARE
Problem: Discharge Planning  Goal: Discharge to home or other facility with appropriate resources  Outcome: Progressing  Flowsheets (Taken 11/23/2022 1730 by Girish Dias RN)  Discharge to home or other facility with appropriate resources:   Identify barriers to discharge with patient and caregiver   Arrange for needed discharge resources and transportation as appropriate   Identify discharge learning needs (meds, wound care, etc)     Problem: Pain  Goal: Verbalizes/displays adequate comfort level or baseline comfort level  Outcome: Progressing     Problem: ABCDS Injury Assessment  Goal: Absence of physical injury  Outcome: Progressing

## 2022-11-24 NOTE — PLAN OF CARE
Notified Dr. Roberta Vasquez of the following via secure message: Patient's pharmacy is closed due to the holidays. Plan is to d/c patient after her evening dose of Coreg. Patient stated that she will pick her prescriptions up tomorrow morning. Thanks and please let me know if you have any concerns with this plan. Dr. Roberta Vasquez responded that he was agreeable with this plan.

## 2022-11-24 NOTE — PROGRESS NOTES
Physical Therapy        Physical Therapy Cancel Note      DATE: 2022    NAME: Kleber Taylor  MRN: [id number]   : 1960      Patient not seen this date for Physical Therapy due to:    Discussed with nurse and patient. Patient is independent. Has no concerns with mobility or falls. Defer jorge MARTINEZ PT.       Electronically signed by Jhoan Garcia PT on 2022 at 11:31 AM

## 2022-11-24 NOTE — PLAN OF CARE
Reviewed d/c paperwork with patient. Plan is to discharge patient after she receives her evening dose of Coreg since patient's pharmacy is closed for the holidays. 1645pm PIV removed, patient's ride arrived, patient discharged home.      Problem: Discharge Planning  Goal: Discharge to home or other facility with appropriate resources  11/24/2022 1401 by Michael Monet RN  Outcome: Completed  11/24/2022 0120 by Lavonne Cano RN  Outcome: Progressing  Flowsheets (Taken 11/23/2022 1730 by Charles Stone RN)  Discharge to home or other facility with appropriate resources:   Identify barriers to discharge with patient and caregiver   Arrange for needed discharge resources and transportation as appropriate   Identify discharge learning needs (meds, wound care, etc)     Problem: Pain  Goal: Verbalizes/displays adequate comfort level or baseline comfort level  11/24/2022 1401 by Michael Monet RN  Outcome: Completed  11/24/2022 0120 by Lavonne Cano RN  Outcome: Progressing     Problem: ABCDS Injury Assessment  Goal: Absence of physical injury  11/24/2022 1401 by Michael Monet RN  Outcome: Completed  11/24/2022 0120 by Lavonne Cano RN  Outcome: Progressing

## 2022-11-25 LAB
ALDOSTERONE: 16.1 NG/DL
EKG ATRIAL RATE: 67 BPM
EKG P AXIS: 69 DEGREES
EKG P-R INTERVAL: 142 MS
EKG Q-T INTERVAL: 412 MS
EKG QRS DURATION: 86 MS
EKG QTC CALCULATION (BAZETT): 435 MS
EKG R AXIS: 6 DEGREES
EKG T AXIS: -9 DEGREES
EKG VENTRICULAR RATE: 67 BPM

## 2022-11-25 PROCEDURE — 93010 ELECTROCARDIOGRAM REPORT: CPT | Performed by: INTERNAL MEDICINE

## 2022-11-27 LAB
CREATININE URINE /24 HR: NORMAL MG/D (ref 500–1400)
CREATININE URINE /VOLUME: 27 MG/DL
HOURS COLLECTED: NORMAL
METANEPHRINE UF INTERPRETATION: NORMAL
METANEPHRINE UG/G CRE: 52 UG/G CRT (ref 0–300)
METANEPHRINES 24 HOUR URINE: NORMAL UG/D (ref 36–229)
METANEPHRINES, URINE (UMOL/L): 14 UG/L
NORMETANEPHRINE, (G/CRT): 319 UG/G CRT (ref 0–400)
NORMETANEPHRINE, (NMOL/DAY): NORMAL UG/D (ref 95–650)
NORMETANEPHRINES, NMOL/L: 86 UG/L
RENIN ACTIVITY: 0.4 NG/ML/HR
URINE VOLUME: NORMAL

## 2022-11-29 ASSESSMENT — ENCOUNTER SYMPTOMS
CHEST TIGHTNESS: 0
BACK PAIN: 0
EYE ITCHING: 0
ABDOMINAL DISTENTION: 0
ABDOMINAL PAIN: 0
EYE DISCHARGE: 0
APNEA: 0
ANAL BLEEDING: 0

## 2022-11-29 NOTE — DISCHARGE SUMMARY
[address]     Department of Internal Medicine - Staff Internal Medicine Teaching Service    INPATIENT DISCHARGE SUMMARY      Patient Identification:  Van Rutherford is a 57 y.o. female. :  1960  MRN: [id number]     Acct: [de-identified]   PCP: No primary care provider on file. Admit Date:  2022  Discharge date and time: 2022  4:45 PM   Attending Provider: No att. providers found                                     [address] Problem Lists:  Principal Problem (Resolved): Hypertensive emergency  Active Problems:    Left renal artery stenosis (HCC)  Resolved Problems:    Chest pain    Migraine      HOSPITAL STAY     Brief Inpatient course: Van Rutherford is a 57 y.o. female who was admitted for the management of Hypertensive emergency, presented to the emergency department with chest pain, very high blood pressure in the range of 210/190. In the ED, Cardene drip was started and then when the blood pressure was optimized Cardene drip was discontinued. Started on oral BP meds Norvasc 5 mg once daily, Coreg 12.5 mg oral twice daily. Patient's blood pressures were optimized. Patient was discharged with appropriate discharge instruction and follow-up with the PCP. Work-up for secondary hypertension's were negative. Physical Exam  Constitutional:       Appearance: Normal appearance. Cardiovascular:      Rate and Rhythm: Normal rate and regular rhythm. Pulses: Normal pulses. Heart sounds: Normal heart sounds. Pulmonary:      Effort: No respiratory distress. Breath sounds: No stridor. No wheezing. Abdominal:      General: Abdomen is flat. There is no distension. Palpations: Abdomen is soft. Tenderness: There is no abdominal tenderness. Musculoskeletal:         General: No swelling or tenderness. Neurological:      General: No focal deficit present.       Mental Status: She is alert and oriented to person, place, and time. Cranial Nerves: No cranial nerve deficit. Motor: No weakness. Psychiatric:         Mood and Affect: Mood normal.         Behavior: Behavior normal.      Review of Systems   Constitutional:  Negative for activity change and appetite change. HENT:  Negative for congestion and dental problem. Eyes:  Negative for discharge and itching. Respiratory:  Negative for apnea and chest tightness. Cardiovascular:  Negative for chest pain and leg swelling. Gastrointestinal:  Negative for abdominal distention, abdominal pain and anal bleeding. Endocrine: Negative for cold intolerance and heat intolerance. Genitourinary:  Negative for difficulty urinating, dyspareunia and dysuria. Musculoskeletal:  Negative for arthralgias, back pain and gait problem. Neurological:  Negative for dizziness, facial asymmetry and headaches. Hematological:  Negative for adenopathy. Does not bruise/bleed easily.         Consults:     Consults:     Final Specialist Recommendations/Findings:   IP CONSULT TO INTERNAL MEDICINE  IP CONSULT TO CASE MANAGEMENT      Any Hospital Acquired Infections: none    Discharge Functional Status:  stable    DISCHARGE PLAN     Disposition: home    Patient Instructions:   Discharge Medication List as of 11/24/2022  1:05 PM        START taking these medications    Details   amLODIPine (NORVASC) 5 MG tablet Take 1 tablet by mouth daily, Disp-30 tablet, R-3Normal      carvedilol (COREG) 12.5 MG tablet Take 1 tablet by mouth 2 times daily (with meals), Disp-60 tablet, R-3Normal           CONTINUE these medications which have NOT CHANGED    Details   Multiple Vitamins-Minerals (THERAPEUTIC MULTIVITAMIN-MINERALS) tablet Take 1 tablet by mouth dailyHistorical Med      Ascorbic Acid (VITAMIN C) 250 MG tablet Take 250 mg by mouth dailyHistorical Med      ibuprofen (ADVIL;MOTRIN) 800 MG tablet TAKE ONE TABLET BY MOUTH EVERY 8 HOURS AS NEEDED FOR PAIN, Disp-120 tablet, R-0Normal           STOP taking these medications       amitriptyline (ELAVIL) 25 MG tablet Comments:   Reason for Stopping:         metoprolol (LOPRESSOR) 50 MG tablet Comments:   Reason for Stopping:         fluticasone (FLONASE) 50 MCG/ACT nasal spray Comments:   Reason for Stopping:               Activity: activity as tolerated    Diet: cardiac diet    Follow-up:    [facility] INTERNAL MEDICINE  [address] [id number]  Follow up in 1 week(s)      [facility] [address] [id number]  Follow up in 1 week(s)        Patient Instructions: -Please follow-up with your primary care provider in 1 week. If you do not have 1 please establish with the internal medicine clinic (number provided)  -Please follow-up with nephrology for renal artery stenosis  -Please take blood pressure medications as written  -Consider starting a statin medication. Discussed with your primary care provider. Holly Pedraza MD,   Internal Medicine Resident, PGY-1  [facility];  [place], [place]  11/29/2022, 3:18 PM